# Patient Record
Sex: FEMALE | Race: WHITE | URBAN - METROPOLITAN AREA
[De-identification: names, ages, dates, MRNs, and addresses within clinical notes are randomized per-mention and may not be internally consistent; named-entity substitution may affect disease eponyms.]

---

## 2019-05-20 ENCOUNTER — APPOINTMENT (RX ONLY)
Dept: URBAN - METROPOLITAN AREA CLINIC 111 | Facility: CLINIC | Age: 39
Setting detail: DERMATOLOGY
End: 2019-05-20

## 2019-05-20 DIAGNOSIS — D22 MELANOCYTIC NEVI: ICD-10-CM

## 2019-05-20 DIAGNOSIS — L81.4 OTHER MELANIN HYPERPIGMENTATION: ICD-10-CM

## 2019-05-20 DIAGNOSIS — L81.3 CAFÉ AU LAIT SPOTS: ICD-10-CM

## 2019-05-20 DIAGNOSIS — Z71.89 OTHER SPECIFIED COUNSELING: ICD-10-CM

## 2019-05-20 PROBLEM — D22.61 MELANOCYTIC NEVI OF RIGHT UPPER LIMB, INCLUDING SHOULDER: Status: ACTIVE | Noted: 2019-05-20

## 2019-05-20 PROBLEM — D48.5 NEOPLASM OF UNCERTAIN BEHAVIOR OF SKIN: Status: ACTIVE | Noted: 2019-05-20

## 2019-05-20 PROBLEM — D22.72 MELANOCYTIC NEVI OF LEFT LOWER LIMB, INCLUDING HIP: Status: ACTIVE | Noted: 2019-05-20

## 2019-05-20 PROBLEM — D22.71 MELANOCYTIC NEVI OF RIGHT LOWER LIMB, INCLUDING HIP: Status: ACTIVE | Noted: 2019-05-20

## 2019-05-20 PROBLEM — J30.1 ALLERGIC RHINITIS DUE TO POLLEN: Status: ACTIVE | Noted: 2019-05-20

## 2019-05-20 PROBLEM — D22.5 MELANOCYTIC NEVI OF TRUNK: Status: ACTIVE | Noted: 2019-05-20

## 2019-05-20 PROBLEM — D22.62 MELANOCYTIC NEVI OF LEFT UPPER LIMB, INCLUDING SHOULDER: Status: ACTIVE | Noted: 2019-05-20

## 2019-05-20 PROCEDURE — 11103 TANGNTL BX SKIN EA SEP/ADDL: CPT

## 2019-05-20 PROCEDURE — ? PRODUCT LINE (OFFICE PRODUCTS)

## 2019-05-20 PROCEDURE — ? OBSERVATION

## 2019-05-20 PROCEDURE — 99214 OFFICE O/P EST MOD 30 MIN: CPT | Mod: 25

## 2019-05-20 PROCEDURE — 11102 TANGNTL BX SKIN SINGLE LES: CPT

## 2019-05-20 PROCEDURE — ? BIOPSY BY SHAVE METHOD

## 2019-05-20 PROCEDURE — ? COUNSELING

## 2019-05-20 ASSESSMENT — LOCATION DETAILED DESCRIPTION DERM
LOCATION DETAILED: MIDDLE STERNUM
LOCATION DETAILED: LEFT DISTAL CALF
LOCATION DETAILED: RIGHT DISTAL POSTERIOR THIGH
LOCATION DETAILED: LEFT SUPERIOR MEDIAL UPPER BACK
LOCATION DETAILED: PERIUMBILICAL SKIN
LOCATION DETAILED: RIGHT RADIAL DORSAL HAND
LOCATION DETAILED: LEFT INFERIOR MEDIAL FOREHEAD
LOCATION DETAILED: LEFT DISTAL DORSAL FOREARM
LOCATION DETAILED: LEFT ANTERIOR PROXIMAL UPPER ARM
LOCATION DETAILED: LEFT SUPERIOR LATERAL LOWER BACK
LOCATION DETAILED: LEFT INFERIOR UPPER BACK
LOCATION DETAILED: LEFT DISTAL POSTERIOR UPPER ARM
LOCATION DETAILED: LEFT DISTAL POSTERIOR THIGH
LOCATION DETAILED: LEFT PROXIMAL DORSAL FOREARM
LOCATION DETAILED: RIGHT BUTTOCK
LOCATION DETAILED: RIGHT DISTAL CALF
LOCATION DETAILED: RIGHT PROXIMAL DORSAL FOREARM
LOCATION DETAILED: LEFT ULNAR DORSAL HAND

## 2019-05-20 ASSESSMENT — LOCATION SIMPLE DESCRIPTION DERM
LOCATION SIMPLE: LEFT UPPER BACK
LOCATION SIMPLE: LEFT FOREHEAD
LOCATION SIMPLE: RIGHT POSTERIOR THIGH
LOCATION SIMPLE: ABDOMEN
LOCATION SIMPLE: LEFT CALF
LOCATION SIMPLE: LEFT HAND
LOCATION SIMPLE: RIGHT BUTTOCK
LOCATION SIMPLE: CHEST
LOCATION SIMPLE: RIGHT HAND
LOCATION SIMPLE: LEFT FOREARM
LOCATION SIMPLE: LEFT LOWER BACK
LOCATION SIMPLE: LEFT POSTERIOR UPPER ARM
LOCATION SIMPLE: RIGHT CALF
LOCATION SIMPLE: LEFT POSTERIOR THIGH
LOCATION SIMPLE: RIGHT FOREARM
LOCATION SIMPLE: LEFT UPPER ARM

## 2019-05-20 ASSESSMENT — LOCATION ZONE DERM
LOCATION ZONE: LEG
LOCATION ZONE: FACE
LOCATION ZONE: TRUNK
LOCATION ZONE: ARM
LOCATION ZONE: HAND

## 2019-05-20 NOTE — PROCEDURE: BIOPSY BY SHAVE METHOD
Dressing: bandage
Post-Care Instructions: I reviewed with the patient in detail post-care instructions. Patient is to keep the biopsy site dry overnight, and then apply Polysporin twice daily with a bandage until healed.
Electrodesiccation And Curettage Text: The wound bed was treated with electrodesiccation and curettage after the biopsy was performed.
Type Of Destruction Used: Curettage
Render In Bullet Format When Appropriate: No
Anesthesia Volume In Cc (Will Not Render If 0): 0.5
Billing Type: Third-Party Bill
Detail Level: Simple
Biopsy Method: Dermablade
Notification Instructions: Patient will be notified of biopsy results. However, patient instructed to call the office if not contacted within 2 weeks.
Was A Bandage Applied: Yes
Body Location Override (Optional - Billing Will Still Be Based On Selected Body Map Location If Applicable): left upper arm
Hemostasis: Drysol
Silver Nitrate Text: The wound bed was treated with silver nitrate after the biopsy was performed.
Additional Anesthesia Volume In Cc (Will Not Render If 0): 0
Cryotherapy Text: The wound bed was treated with cryotherapy after the biopsy was performed.
Anesthesia Type: 0.5% marcaine
Size Of Lesion In Cm: 0.6
Lab: [id number]
Consent: Written consent was obtained and risks were reviewed including but not limited to scarring, infection, bleeding, scabbing, incomplete removal, nerve damage and allergy to anesthesia.
Curettage Text: The wound bed was treated with curettage after the biopsy was performed.
Electrodesiccation Text: The wound bed was treated with electrodesiccation after the biopsy was performed.
Biopsy Type: H and E
Wound Care: Polysporin ointment
Lab Facility: [id number]
Depth Of Biopsy: dermis
Body Location Override (Optional - Billing Will Still Be Based On Selected Body Map Location If Applicable): left lower mid back
Billing Type: Third-Party Bill
Size Of Lesion In Cm: 0.7
Lab: [id number]
Lab Facility: [id number]

## 2019-05-20 NOTE — HPI: EVALUATION OF SKIN LESION(S)
What Type Of Note Output Would You Prefer (Optional)?: Standard Output
How Severe Are Your Spot(S)?: moderate
Hpi Title: Evaluation of Skin Lesions
Additional History: Patient presents for annual skin exam. Last skin exam was with LD 02/2017.

## 2019-05-20 NOTE — PROCEDURE: PRODUCT LINE (OFFICE PRODUCTS)
Product 12 Price (In Dollars - Numeric Only, No Special Characters Or $): 150.00
Product 51 Units: 0
Product 37 Price (In Dollars - Numeric Only, No Special Characters Or $): 0.00
Product 31 Price (In Dollars - Numeric Only, No Special Characters Or $): 13.00
Name Of Product 17: Oxygen Infusion Wash
Name Of Product 12: Linda
Product 26 Price (In Dollars - Numeric Only, No Special Characters Or $): 136.00
Product 12 Units: 1
Allow Plan To Count Towards E/M Coding: Yes
Name Of Product 22: SkinBeter Eye Cream
Name Of Product 3: CeraVe Moisturizing Cream
Product 17 Price (In Dollars - Numeric Only, No Special Characters Or $): 38.00
Name Of Product 27: Skin Drops
Name Of Product 8: Glytone Mild Gel Cleanser
Product 22 Price (In Dollars - Numeric Only, No Special Characters Or $): 108.00
Name Of Product 32: Triple Paste Ointment
Product 27 Price (In Dollars - Numeric Only, No Special Characters Or $): 57.00
Name Of Product 13: Neocutis BioCream
Product 8 Price (In Dollars - Numeric Only, No Special Characters Or $): 28.00
Name Of Product 23: SkinBetter Hydration Boosting Cream
Name Of Product 4: DermMend Bruise Formula
Product 32 Price (In Dollars - Numeric Only, No Special Characters Or $): 18.00
Name Of Product 18: Polysporin
Product 13 Price (In Dollars - Numeric Only, No Special Characters Or $): 128.00
Name Of Product 28: SkinCeuticals QUENTIN Rodríguez
Product 23 Price (In Dollars - Numeric Only, No Special Characters Or $): 87.00
Name Of Product 9: Glytone KP Kit
Product 4 Price (In Dollars - Numeric Only, No Special Characters Or $): 30.00
Product 18 Price (In Dollars - Numeric Only, No Special Characters Or $): 6.00
Name Of Product 14: Anthonytis BioGel
Product 28 Price (In Dollars - Numeric Only, No Special Characters Or $): 115.00
Product 9 Price (In Dollars - Numeric Only, No Special Characters Or $): 56.00
Name Of Product 1: Arnica
Product 4 Application Directions: Apply cream to affected areas to help fade bruises
Name Of Product 19: ReachMate Applicator
Product 1 Price (In Dollars - Numeric Only, No Special Characters Or $): 8.00
Product 14 Price (In Dollars - Numeric Only, No Special Characters Or $): 146.00
Name Of Product 5: Dr Dan’s Cortibalm
Product 19 Price (In Dollars - Numeric Only, No Special Characters Or $): 16.00
Name Of Product 24: SkinBetter Interfuse Treatment Cream
Name Of Product 15: Neocutis Infratelerum
Name Of Product 29: SkinCeuticals C.E. Ferulic
Name Of Product 10: Lumity Supplements
Product 1 Application Directions: Place two tablets under tongue to help minimize bruising
Name Of Product 20: Edison Clark
Product 15 Price (In Dollars - Numeric Only, No Special Characters Or $): 254.00
Name Of Product 2: CeraVe Hydrating Cleanser
Name Of Product 25: SkinBetter Invisilift Mask
Name Of Product 6: Elta MD Melting Moisturizer
Product 20 Price (In Dollars - Numeric Only, No Special Characters Or $): 120.00
Name Of Product 30: SkinCeuticals Phloretin
Name Of Product 11: Mediplast
Name Of Product 16: Neocutis Lumiere
Product 11 Price (In Dollars - Numeric Only, No Special Characters Or $): 1.250
Name Of Product 21: SkinBetter Alto Defense Serum
Product 16 Price (In Dollars - Numeric Only, No Special Characters Or $): 70.00
Product 7 Price (In Dollars - Numeric Only, No Special Characters Or $): 29.00
Name Of Product 31: Triple Paste AF Cream
Detail Level: Zone
Name Of Product 26: SkinBetter Lines
Product 21 Price (In Dollars - Numeric Only, No Special Characters Or $): 157.00
Name Of Product 7: Elta MD PM

## 2019-05-20 NOTE — PROCEDURE: OBSERVATION
Body Location Override (Optional - Billing Will Still Be Based On Selected Body Map Location If Applicable): left mid back
Size Of Lesion In Cm (Optional): 0
Detail Level: Detailed
Morphology Per Location (Optional): Pigment recurrence within scar, previously biopsied as mild